# Patient Record
Sex: FEMALE | Race: BLACK OR AFRICAN AMERICAN | NOT HISPANIC OR LATINO | URBAN - NONMETROPOLITAN AREA
[De-identification: names, ages, dates, MRNs, and addresses within clinical notes are randomized per-mention and may not be internally consistent; named-entity substitution may affect disease eponyms.]

---

## 2019-03-21 NOTE — PATIENT DISCUSSION
- Does not yet desire a distance Rx.  Advised her to increase her OTC reading glasses power to 1.50-1.75

## 2019-08-14 ENCOUNTER — IMPORTED ENCOUNTER (OUTPATIENT)
Dept: URBAN - NONMETROPOLITAN AREA CLINIC 1 | Facility: CLINIC | Age: 74
End: 2019-08-14

## 2019-08-14 PROCEDURE — 92015 DETERMINE REFRACTIVE STATE: CPT

## 2019-08-14 PROCEDURE — 99213 OFFICE O/P EST LOW 20 MIN: CPT

## 2019-08-14 PROCEDURE — 92134 CPTRZ OPH DX IMG PST SGM RTA: CPT

## 2019-08-14 NOTE — PATIENT DISCUSSION
AMD/Drusen- dry-OCT MAC performed and reviewed with patient stable-Explained dry AMD-Recommend pt begin AREDS 2 MVT or Centrum Silver and monitoring Amsler grid.-Amsler grid given and explained to pt. Recommend monitoring daily. Pt is to contact our office if any changes are noted. -order  [facility] IOL OU w/ PCO OD-pt not symptomatic-continue to monitorPapilloma RLL about 1mm-explained condition to pt and that it can be removed if becomes bothersome-continue to monitorDES OU-no plugs in-recommend Refresh Optive Gel Drops OU prnRTC 1 YR CEE OCT MAC; 's Notes: OCT MAC 08/14/19Dilation 08/14/19

## 2019-08-16 PROBLEM — H26.491: Noted: 2019-08-16

## 2019-08-16 PROBLEM — Z96.1: Noted: 2019-08-16

## 2019-08-16 PROBLEM — H04.123: Noted: 2019-08-16

## 2019-08-16 PROBLEM — H35.363: Noted: 2019-08-16

## 2019-08-16 PROBLEM — H35.3131: Noted: 2019-08-16

## 2019-08-16 PROBLEM — H16.223: Noted: 2019-08-16

## 2020-10-15 ENCOUNTER — IMPORTED ENCOUNTER (OUTPATIENT)
Dept: URBAN - NONMETROPOLITAN AREA CLINIC 1 | Facility: CLINIC | Age: 75
End: 2020-10-15

## 2020-10-15 PROCEDURE — 92014 COMPRE OPH EXAM EST PT 1/>: CPT

## 2020-10-15 PROCEDURE — 92134 CPTRZ OPH DX IMG PST SGM RTA: CPT

## 2020-10-15 PROCEDURE — 92015 DETERMINE REFRACTIVE STATE: CPT

## 2020-10-15 NOTE — PATIENT DISCUSSION
AMD/Drusen- dryOCT MAC performed and reviewed with patientExplained dry AMDRecommend pt continue AREDS 2 MVT or Centrum Silver Pt is to contact our office if any changes are noted. Order  [facility] IOL OU w/ PCO ODcontinue to monitorPapilloma RLL about 1mmexplained condition to pt and that it can be removed if becomes bothersomecontinue to monitorDES OUrecommend Refresh Optive Gel Drops OU prnRTC 1 YR CEE OCT MAC; 's Notes: OCT MAC 08/14/19Dilation 08/14/19

## 2022-04-09 ASSESSMENT — VISUAL ACUITY
OD_PH: 20/30
OU_CC: 20/25
OD_SC: 20/40+1
OS_PH: 20/30+
OS_SC: 20/40+
OS_SC: 20/30
OD_CC: J1
OD_SC: 20/30
OS_CC: J1

## 2022-04-09 ASSESSMENT — TONOMETRY
OD_IOP_MMHG: 16
OS_IOP_MMHG: 19
OS_IOP_MMHG: 20
OD_IOP_MMHG: 16

## 2022-07-05 ENCOUNTER — EMERGENCY VISIT (OUTPATIENT)
Dept: RURAL CLINIC 2 | Facility: CLINIC | Age: 77
End: 2022-07-05

## 2022-07-05 DIAGNOSIS — H26.491: ICD-10-CM

## 2022-07-05 DIAGNOSIS — H43.812: ICD-10-CM

## 2022-07-05 DIAGNOSIS — Z96.1: ICD-10-CM

## 2022-07-05 DIAGNOSIS — H16.223: ICD-10-CM

## 2022-07-05 PROCEDURE — 92134 CPTRZ OPH DX IMG PST SGM RTA: CPT

## 2022-07-05 PROCEDURE — 99214 OFFICE O/P EST MOD 30 MIN: CPT

## 2022-07-05 ASSESSMENT — TONOMETRY
OS_IOP_MMHG: 18
OD_IOP_MMHG: 17

## 2022-07-05 ASSESSMENT — VISUAL ACUITY
OD_CC: 20/20
OS_CC: 20/20

## 2022-09-07 ENCOUNTER — ESTABLISHED PATIENT (OUTPATIENT)
Dept: RURAL CLINIC 2 | Facility: CLINIC | Age: 77
End: 2022-09-07

## 2022-09-07 DIAGNOSIS — H26.491: ICD-10-CM

## 2022-09-07 DIAGNOSIS — Z96.1: ICD-10-CM

## 2022-09-07 DIAGNOSIS — H43.812: ICD-10-CM

## 2022-09-07 DIAGNOSIS — H16.223: ICD-10-CM

## 2022-09-07 PROCEDURE — 99213 OFFICE O/P EST LOW 20 MIN: CPT

## 2022-09-07 ASSESSMENT — TONOMETRY
OD_IOP_MMHG: 18
OS_IOP_MMHG: 21

## 2022-09-07 ASSESSMENT — VISUAL ACUITY
OD_CC: 20/25
OS_CC: 20/20

## 2025-03-17 ENCOUNTER — HOSPITAL ENCOUNTER (OUTPATIENT)
Facility: HOSPITAL | Age: 80
Setting detail: SPECIMEN
Discharge: HOME | End: 2025-03-20
Payer: MEDICARE

## 2025-03-17 ENCOUNTER — OFFICE VISIT (OUTPATIENT)
Age: 80
End: 2025-03-17
Payer: MEDICARE

## 2025-03-17 VITALS
TEMPERATURE: 97 F | SYSTOLIC BLOOD PRESSURE: 131 MMHG | BODY MASS INDEX: 41.77 KG/M2 | HEART RATE: 60 BPM | OXYGEN SATURATION: 97 % | WEIGHT: 227 LBS | DIASTOLIC BLOOD PRESSURE: 71 MMHG | HEIGHT: 62 IN

## 2025-03-17 DIAGNOSIS — R53.81 MALAISE AND FATIGUE: ICD-10-CM

## 2025-03-17 DIAGNOSIS — R06.02 EXERTIONAL SHORTNESS OF BREATH: ICD-10-CM

## 2025-03-17 DIAGNOSIS — I50.32 CHRONIC DIASTOLIC (CONGESTIVE) HEART FAILURE: ICD-10-CM

## 2025-03-17 DIAGNOSIS — I10 PRIMARY HYPERTENSION: ICD-10-CM

## 2025-03-17 DIAGNOSIS — I51.7 LVH (LEFT VENTRICULAR HYPERTROPHY): ICD-10-CM

## 2025-03-17 DIAGNOSIS — R01.1 SYSTOLIC MURMUR: ICD-10-CM

## 2025-03-17 DIAGNOSIS — E66.01 CLASS 3 SEVERE OBESITY DUE TO EXCESS CALORIES WITHOUT SERIOUS COMORBIDITY WITH BODY MASS INDEX (BMI) OF 40.0 TO 44.9 IN ADULT: ICD-10-CM

## 2025-03-17 DIAGNOSIS — R00.2 PALPITATIONS: Primary | ICD-10-CM

## 2025-03-17 DIAGNOSIS — R07.89 OTHER CHEST PAIN: ICD-10-CM

## 2025-03-17 DIAGNOSIS — R00.1 BRADYCARDIA: ICD-10-CM

## 2025-03-17 DIAGNOSIS — E78.00 HYPERCHOLESTEREMIA: ICD-10-CM

## 2025-03-17 DIAGNOSIS — R53.83 MALAISE AND FATIGUE: ICD-10-CM

## 2025-03-17 DIAGNOSIS — E66.813 CLASS 3 SEVERE OBESITY DUE TO EXCESS CALORIES WITHOUT SERIOUS COMORBIDITY WITH BODY MASS INDEX (BMI) OF 40.0 TO 44.9 IN ADULT: ICD-10-CM

## 2025-03-17 LAB — NT PRO BNP: 290 PG/ML (ref 0–1800)

## 2025-03-17 PROCEDURE — G8400 PT W/DXA NO RESULTS DOC: HCPCS | Performed by: INTERNAL MEDICINE

## 2025-03-17 PROCEDURE — 1126F AMNT PAIN NOTED NONE PRSNT: CPT | Performed by: INTERNAL MEDICINE

## 2025-03-17 PROCEDURE — 3075F SYST BP GE 130 - 139MM HG: CPT | Performed by: INTERNAL MEDICINE

## 2025-03-17 PROCEDURE — 93000 ELECTROCARDIOGRAM COMPLETE: CPT | Performed by: INTERNAL MEDICINE

## 2025-03-17 PROCEDURE — 3078F DIAST BP <80 MM HG: CPT | Performed by: INTERNAL MEDICINE

## 2025-03-17 PROCEDURE — 99205 OFFICE O/P NEW HI 60 MIN: CPT | Performed by: INTERNAL MEDICINE

## 2025-03-17 PROCEDURE — G8417 CALC BMI ABV UP PARAM F/U: HCPCS | Performed by: INTERNAL MEDICINE

## 2025-03-17 PROCEDURE — 1036F TOBACCO NON-USER: CPT | Performed by: INTERNAL MEDICINE

## 2025-03-17 PROCEDURE — 1159F MED LIST DOCD IN RCRD: CPT | Performed by: INTERNAL MEDICINE

## 2025-03-17 PROCEDURE — 83880 ASSAY OF NATRIURETIC PEPTIDE: CPT

## 2025-03-17 PROCEDURE — 36415 COLL VENOUS BLD VENIPUNCTURE: CPT

## 2025-03-17 PROCEDURE — 1090F PRES/ABSN URINE INCON ASSESS: CPT | Performed by: INTERNAL MEDICINE

## 2025-03-17 PROCEDURE — 1160F RVW MEDS BY RX/DR IN RCRD: CPT | Performed by: INTERNAL MEDICINE

## 2025-03-17 PROCEDURE — 1123F ACP DISCUSS/DSCN MKR DOCD: CPT | Performed by: INTERNAL MEDICINE

## 2025-03-17 PROCEDURE — G8427 DOCREV CUR MEDS BY ELIG CLIN: HCPCS | Performed by: INTERNAL MEDICINE

## 2025-03-17 RX ORDER — SPIRONOLACTONE AND HYDROCHLOROTHIAZIDE 25; 25 MG/1; MG/1
1 TABLET ORAL DAILY
COMMUNITY

## 2025-03-17 RX ORDER — PANTOPRAZOLE SODIUM 40 MG/1
40 TABLET, DELAYED RELEASE ORAL DAILY
COMMUNITY

## 2025-03-17 RX ORDER — AMIODARONE HYDROCHLORIDE 200 MG/1
200 TABLET ORAL DAILY
COMMUNITY
Start: 2024-12-18 | End: 2025-03-17 | Stop reason: CLARIF

## 2025-03-17 RX ORDER — POTASSIUM CHLORIDE 750 MG/1
10 CAPSULE, EXTENDED RELEASE ORAL DAILY
COMMUNITY

## 2025-03-17 RX ORDER — PRAVASTATIN SODIUM 40 MG
40 TABLET ORAL EVERY EVENING
COMMUNITY

## 2025-03-17 RX ORDER — NEBIVOLOL 5 MG/1
5 TABLET ORAL DAILY
COMMUNITY

## 2025-03-17 RX ORDER — ASPIRIN 81 MG/1
81 TABLET ORAL DAILY
COMMUNITY

## 2025-03-17 ASSESSMENT — ENCOUNTER SYMPTOMS
GASTROINTESTINAL NEGATIVE: 1
SHORTNESS OF BREATH: 1
ALLERGIC/IMMUNOLOGIC NEGATIVE: 1
EYES NEGATIVE: 1

## 2025-03-17 ASSESSMENT — PATIENT HEALTH QUESTIONNAIRE - PHQ9
SUM OF ALL RESPONSES TO PHQ QUESTIONS 1-9: 0
1. LITTLE INTEREST OR PLEASURE IN DOING THINGS: NOT AT ALL
2. FEELING DOWN, DEPRESSED OR HOPELESS: NOT AT ALL
SUM OF ALL RESPONSES TO PHQ QUESTIONS 1-9: 0

## 2025-03-17 NOTE — PROGRESS NOTES
1. \"Have you been to the ER, urgent care clinic since your last visit?  Hospitalized since your last visit?\" Reviewed by Dr. Anthony Rand     2. \"Have you seen or consulted any other health care providers outside of the [facility] since your last visit?\" Reviewed by Dr. Anthony Rand

## 2025-03-17 NOTE — PROGRESS NOTES
Zully Malave (:  1945) is a 79 y.o. female,New patient, here for evaluation of the following chief complaint(s):  New Patient (Est care)    Subjective   SUBJECTIVE/OBJECTIVE:  History of Present Illness  The patient presents for evaluation of irregular heartbeat. Ms. Malave is a 76-year-old  female referred by Dr. Gonzalez in [place], [place], for evaluation of bradycardic and PVCs. Ms. Malave has a longstanding history of hypertension. She had been on verapamil for several years. She tells me she went to have an endoscopy and was noted to be bradycardic. The procedure was canceled. She was referred to her primary care physician. He stopped the verapamil and switched her to Norvasc. Patient was not aware of the slow heart rate. There was no presyncope or syncope. She recalled that she was having irregular heartbeat many years ago, probably more than 20 years ago. At that time, she was seen by cardiologist in [place]. She recalled having what sounds like a nuclear test and was told everything was good. She was supposed to follow up with cardiologist, but has not seen one in many years. Her referral indicates PVCs and bradycardia. I do not have the EKG from [place]. Patient is in sinus rhythm today. She tells me her blood pressure is doing well. She has some limitation in her activity. She gets some dyspnea on exertion, but does not really explain her chest pain. She has had previous knee replacement surgery. She has some issues with arthritis. She has had a previous cholecystectomy. Said no to her endoscopy until she can get checked. She lives in [place], [place]. She has a daughter who lives in this area. She is .     She has been experiencing intermittent episodes of irregular heartbeat for over a year, with more than 40 episodes of ventricular tachycardia recorded during a heart monitoring session in 10/2024. She reports occasional palpitations and

## 2025-03-27 ENCOUNTER — TELEPHONE (OUTPATIENT)
Age: 80
End: 2025-03-27

## 2025-04-04 NOTE — TELEPHONE ENCOUNTER
2 Patient Identifiers Confirmed   Spoke w/patient informed her that labs were normal per MICHELE Dennis. Pt verbalized understanding and thanks us